# Patient Record
Sex: MALE | Race: WHITE | URBAN - METROPOLITAN AREA
[De-identification: names, ages, dates, MRNs, and addresses within clinical notes are randomized per-mention and may not be internally consistent; named-entity substitution may affect disease eponyms.]

---

## 2022-02-14 ENCOUNTER — APPOINTMENT (RX ONLY)
Dept: URBAN - METROPOLITAN AREA CLINIC 147 | Facility: CLINIC | Age: 62
Setting detail: DERMATOLOGY
End: 2022-02-14

## 2022-02-14 DIAGNOSIS — L91.0 HYPERTROPHIC SCAR: ICD-10-CM

## 2022-02-14 DIAGNOSIS — Z85.828 PERSONAL HISTORY OF OTHER MALIGNANT NEOPLASM OF SKIN: ICD-10-CM

## 2022-02-14 PROCEDURE — 99202 OFFICE O/P NEW SF 15 MIN: CPT

## 2022-02-14 PROCEDURE — ? ADDITIONAL NOTES

## 2022-02-14 PROCEDURE — ? COUNSELING

## 2022-02-14 ASSESSMENT — LOCATION SIMPLE DESCRIPTION DERM: LOCATION SIMPLE: RIGHT EAR

## 2022-02-14 ASSESSMENT — LOCATION ZONE DERM: LOCATION ZONE: EAR

## 2022-02-14 ASSESSMENT — LOCATION DETAILED DESCRIPTION DERM: LOCATION DETAILED: RIGHT TRIANGULAR FOSSA

## 2022-02-14 NOTE — PROCEDURE: ADDITIONAL NOTES
Render Risk Assessment In Note?: no
Detail Level: Simple
Additional Notes: Suggested patient use scar away gel if scarring appearance bothers him.
Additional Notes: No evidence of recurrent carcinoma. Discussed if area bleeds, becomes tender to return for biopsy. Area healed by secondary intent. Performed 4+ months ago

## 2023-06-07 ENCOUNTER — TELEPHONE ENCOUNTER (OUTPATIENT)
Dept: URBAN - METROPOLITAN AREA CLINIC 9 | Facility: CLINIC | Age: 63
End: 2023-06-07

## 2023-06-07 PROBLEM — 428283002: Status: ACTIVE | Noted: 2023-06-07

## 2023-06-08 ENCOUNTER — OFFICE VISIT (OUTPATIENT)
Dept: URBAN - METROPOLITAN AREA CLINIC 9 | Facility: CLINIC | Age: 63
End: 2023-06-08

## 2023-06-08 NOTE — HPI-TODAY'S VISIT:
Patient has a history of B12 low normal, DM,  who presents for  GI Hx:   Denies weight loss, fever, chills, abdominal pain, nausea, vomiting, diarrhea.  Denies dysphagia, reflux, UGI symptoms. Denies hematemesis, melena, hematochezia, blood per rectum.  EGD:   Colonoscopy: 6/6/2018- 1 polyp, divertic. No records available.  Imaging/Studies/Procedures:

## 2025-04-30 ENCOUNTER — APPOINTMENT (OUTPATIENT)
Dept: URBAN - METROPOLITAN AREA CLINIC 116 | Facility: CLINIC | Age: 65
Setting detail: DERMATOLOGY
End: 2025-04-30

## 2025-04-30 DIAGNOSIS — I87.2 VENOUS INSUFFICIENCY (CHRONIC) (PERIPHERAL): ICD-10-CM | Status: WORSENING

## 2025-04-30 PROCEDURE — ? CEAP CLASSIFICATION

## 2025-04-30 PROCEDURE — ? MEDICAL CONSULTATION: VENOUS DISEASE

## 2025-04-30 PROCEDURE — 99204 OFFICE O/P NEW MOD 45 MIN: CPT

## 2025-04-30 PROCEDURE — ? PHOTO-DOCUMENTATION

## 2025-04-30 PROCEDURE — ? VENOUS CLINICAL SEVERITY SCORE

## 2025-04-30 PROCEDURE — ? RECOMMENDATIONS

## 2025-04-30 ASSESSMENT — LOCATION DETAILED DESCRIPTION DERM
LOCATION DETAILED: LEFT PROXIMAL PRETIBIAL REGION
LOCATION DETAILED: RIGHT DISTAL PRETIBIAL REGION

## 2025-04-30 ASSESSMENT — LOCATION SIMPLE DESCRIPTION DERM
LOCATION SIMPLE: RIGHT PRETIBIAL REGION
LOCATION SIMPLE: LEFT PRETIBIAL REGION

## 2025-04-30 ASSESSMENT — LOCATION ZONE DERM: LOCATION ZONE: LEG

## 2025-04-30 NOTE — HPI: VEIN EVALUATION
"Per Dr. Shin, \"Yes resume and needs to see his PCP for evaluation asap\".     Called and notified pt of above per Dr. Shin. Pt states he had a few nosebleeds in the past. Informed pt this can happen with blood thinners especially during the winter months when the furnace is running and it is dry in the house. Discussed using room humidifier or plain saline nasal spray to keep nasal passages moist. Reinforced resuming Eliquis, BASA and Lasix and to follow up with PCP. Pt verbalizes understanding.    "
Do You Have A Family History Of Vein Disease?: yes
Pt called to report when he woke up on Wed. 4/16/25, he had blood when urinating. Per pt, the toilet bowl was pink, reddish and filled toilet bowl.     Pt states he did not take BASA, Eliquis 5mg twice a day, Furosemide 20mg every other day on Wednesday 4/16/25, Thursday 4/17/25 and today 4/18/25.    Explained rationale and importance of all medications that he stopped and also informed pt the Lasix (water pill) would not cause bleeding, it will help remove fluid by urinating.     Pt reports urine is clear w/o blood today.     Pt asking about resuming Eliquis, BASA, Lasix.    Please advise. Thanks.   
Which Leg Is Worse?: Equally Affected
Is This A New Presentation, Or A Follow-Up?: Vein Evaluation
Additional History: No hx of migraines \\nNo hx of asthma \\nNo hx of a PFO \\nNo previous vein treatment\\nHe has been wearing compression stockings intermittently for the last 6 months. He states they are uncomfortable and do not help with his symptoms. He does not want to continue wearing them since they are not helping \\nNo recent diagnosis of cancer\\nNot currently on iron or doxycycline. \\nNot currently on a blood thinner.\\n\\nCarl has cramping and restless leg several times a week. It will disrupt his sleep.
Family History Of Vein Disease (Include Family Member And Type Of Vein Disease):: Mother, sister

## 2025-04-30 NOTE — PROCEDURE: VENOUS CLINICAL SEVERITY SCORE
Left Leg Inflammation: 0- None
Right Leg Compression Therapy: 1- Intermittent use of stockings
Include Right Vcss In Note: Yes
Right Leg Pain: 2- Daily pain or other discomfort (ie, interfering with but not preventing regular daily activities)
Right Leg Varicose Veins: 2- Confined to calf or thigh
Detail Level: Simple
Right Leg Venous Edema: 2- Extends above ankle but below knee

## 2025-04-30 NOTE — PROCEDURE: MEDICAL CONSULTATION: VENOUS DISEASE
Pathophysiology Set 1: Pr - Reflux
Detail Level: Zone
Right Leg Varicose Veins: 2- Confined to calf or thigh
Right Leg Inflammation: 0- None
Left Leg Compression Therapy: 1- Intermittent use of stockings
Include Vcss In The Note?: No
Right Leg Pain: 2- Daily pain or other discomfort (ie, interfering with but not preventing regular daily activities)
Include Right Vcss In Note: Yes
Etiology Set 2: Ec - Congenital
Left Leg Circumference: medium
Right Dorsalis Pedis Pulse: 2 (Easily palpable)
Clinical Classification Set 2: C0 - no visible or palpable signs of venous disease
Limitations: Patient reports continuing daily activities and daily functions with symptoms of: (** ADD SYMPTOMS HERE**) and is reported to be worse by days end and after activities.
Left Leg: Peripheral Vascular Disease?: not assessed
Anatomy Set 1: As - Superficial Veins
Right Leg Venous Edema: 2- Extends above ankle but below knee

## 2025-04-30 NOTE — PROCEDURE: CEAP CLASSIFICATION
Detail Level: Simple
Initial Location - Right Leg: right lower leg
Initial Location - Left Leg: left leg

## 2025-04-30 NOTE — PROCEDURE: RECOMMENDATIONS
Recommendation Preamble: The following recommendations were made during the visit:\\nPatient to be scheduled for bilateral lower extremity venous ultrasound.\\nPatient to follow up with Dr. Chaparro after ultrasound is completed to review US results.
Patient Management Risk Assessment: Moderate
Render Risk Assessment In Note?: no
Detail Level: Zone

## 2025-05-07 ENCOUNTER — APPOINTMENT (OUTPATIENT)
Dept: URBAN - METROPOLITAN AREA CLINIC 116 | Facility: CLINIC | Age: 65
Setting detail: DERMATOLOGY
End: 2025-05-07

## 2025-05-07 DIAGNOSIS — I87.2 VENOUS INSUFFICIENCY (CHRONIC) (PERIPHERAL): ICD-10-CM

## 2025-05-07 PROCEDURE — ? LOWER EXTREMITY DOPPLER US

## 2025-05-07 PROCEDURE — 93970 EXTREMITY STUDY: CPT

## 2025-05-07 NOTE — PROCEDURE: LOWER EXTREMITY DOPPLER US
Left Intraluminal Thrombus- Yes: The left deep veins were imaged from the level of the common femoral vein to the posterior tibial veins. There was evidence of intraluminal thrombus as noted above.
Intraluminal Thrombus: No
Left Intraluminal Thrombus- No: The left deep veins were imaged from the level of the common femoral vein to the posterior tibial veins. All deep veins demonstrated compressibility without evidence of intraluminal thrombus.
Right Intraluminal Thrombus- No: The right deep veins were imaged from the level of the common femoral vein to the posterior tibial veins. All deep veins demonstrated compressibility without evidence of intraluminal thrombus.
See Attached Documentation Text: Please refer to the attached ultrasound documentation for complete details of the procedure and the venous findings.
Continue Conservative Therapy Text: Continue conservative treatment (such as compression stockings, OTC analgesics, and exercise) and consider intervention if no change or worsening symptoms to varicosities.
Continue Conservative Therapy: Yes
Reflux Options: Use Range
Use - 'see Attached Documentation' Verbiage?: Yes - Will Include Text Below and Will Remove Other Portions of the Note
Right Intraluminal Thrombus- Yes: The right deep veins were imaged from the level of the common femoral vein to the posterior tibial veins. There was evidence of intraluminal thrombus as noted above.
Detail Level: Simple

## 2025-05-28 ENCOUNTER — APPOINTMENT (OUTPATIENT)
Dept: URBAN - METROPOLITAN AREA CLINIC 116 | Facility: CLINIC | Age: 65
Setting detail: DERMATOLOGY
End: 2025-05-28

## 2025-05-28 DIAGNOSIS — I87.2 VENOUS INSUFFICIENCY (CHRONIC) (PERIPHERAL): ICD-10-CM

## 2025-05-28 PROCEDURE — ? VEIN TREATMENT PLAN

## 2025-05-28 PROCEDURE — ? EDUCATIONAL RESOURCES PROVIDED

## 2025-05-28 PROCEDURE — ? RECOMMENDATIONS

## 2025-05-28 PROCEDURE — 99214 OFFICE O/P EST MOD 30 MIN: CPT

## 2025-05-28 NOTE — PROCEDURE: RECOMMENDATIONS
Recommendation Preamble: The following recommendations were made during the visit:\\n-Complete a status post ultrasound with in one week after procedure.\\n-Contact the office at [phone] during business hours with any post procedure concerns or with any questions regarding your post care.
Detail Level: Zone
Render Risk Assessment In Note?: no

## 2025-05-28 NOTE — PROCEDURE: VEIN TREATMENT PLAN
Intro Statement (Will Not Render If Left Blank): Extensive discussion and education was undertaken during the office visit regarding pathophysiology, chronicity and long term prognosis of venous disease. We also discussed risk factors for venous hypertension, diagnostic testing and treatment. Our treatment discussion included conservative management, recommendations and expected follow-up. Patient was advised of their insurance plan requiring a conservative period of 3 months and will need to continue use of all conservative strategies discussed. All questions were answered and no further questions were verbalized by the patient at this time.
Continue Conservative Therapy After U/S: No
Ugs Sessions - Right: 1
Detail Level: Zone
Anastasia Sessions - Left: 2
Continue Conservative Therapy Text: The patient has signs and symptoms of chronic venous hypertension affecting daily function with ultrasound of the lower extremities demonstrating venous insufficiency. The patient will continue conservative treatment and has been counseled on avoiding prolonged standing, leg elevation, analgesics, exercise weight management, and daily graduated compression stockings use (20-30mmHg or higher).